# Patient Record
Sex: FEMALE | Race: WHITE
[De-identification: names, ages, dates, MRNs, and addresses within clinical notes are randomized per-mention and may not be internally consistent; named-entity substitution may affect disease eponyms.]

---

## 2020-01-15 ENCOUNTER — HOSPITAL ENCOUNTER (EMERGENCY)
Dept: HOSPITAL 4 - SED | Age: 35
Discharge: HOME | End: 2020-01-15
Payer: MEDICAID

## 2020-01-15 VITALS — SYSTOLIC BLOOD PRESSURE: 116 MMHG

## 2020-01-15 VITALS — BODY MASS INDEX: 22.99 KG/M2 | WEIGHT: 138 LBS | HEIGHT: 65 IN

## 2020-01-15 VITALS — SYSTOLIC BLOOD PRESSURE: 125 MMHG

## 2020-01-15 DIAGNOSIS — J11.1: Primary | ICD-10-CM

## 2020-01-15 NOTE — NUR
Patient given written and verbal discharge instructions and verbalizes 
understanding.  ER MD discussed with patient the results and treatment 
provided. Patient in stable condition. ID arm band removed. 

Rx of Promethazine, Motrin, Azithromycin and Tamiflu given. Patient educated on 
pain management and to follow up with PMD. Pain Scale 0.

Opportunity for questions provided and answered. Medication side effect fact 
sheet provided.

## 2020-01-15 NOTE — NUR
Patient triaged and placed in waiting room. VSS and patient appears in no acute 
distress at this time. Accompanied by children, awaiting available bed, and MD 
notified of need for MSE.

## 2020-01-15 NOTE — NUR
Patient complains of fever, cough and body aches x 3 days. Pt denies N/V or 
diarrhea. NO other injuries/complaints per patient or noted.